# Patient Record
Sex: FEMALE | Race: WHITE | URBAN - NONMETROPOLITAN AREA
[De-identification: names, ages, dates, MRNs, and addresses within clinical notes are randomized per-mention and may not be internally consistent; named-entity substitution may affect disease eponyms.]

---

## 2021-06-28 ENCOUNTER — OFFICE VISIT (OUTPATIENT)
Dept: URBAN - NONMETROPOLITAN AREA CLINIC 13 | Facility: CLINIC | Age: 31
End: 2021-06-28
Payer: MEDICAID

## 2021-06-28 ENCOUNTER — WEB ENCOUNTER (OUTPATIENT)
Dept: URBAN - NONMETROPOLITAN AREA CLINIC 13 | Facility: CLINIC | Age: 31
End: 2021-06-28

## 2021-06-28 DIAGNOSIS — K21.9 ACID REFLUX: ICD-10-CM

## 2021-06-28 DIAGNOSIS — K28.9 ANASTOMOTIC ULCER: ICD-10-CM

## 2021-06-28 DIAGNOSIS — R19.5 LOOSE STOOLS: ICD-10-CM

## 2021-06-28 DIAGNOSIS — T85.898A OTHER SPECIFIED COMPLICATION OF OTHER INTERNAL PROSTHETIC DEVICES, IMPLANTS AND GRAFTS, INITIAL ENCOUNTER: ICD-10-CM

## 2021-06-28 PROCEDURE — 99204 OFFICE O/P NEW MOD 45 MIN: CPT | Performed by: INTERNAL MEDICINE

## 2021-06-28 NOTE — HPI-TODAY'S VISIT:
Ms. Angelita Maldonado is a pleasant 29 y/o F self-referred to GI clinic for acid reflux and history of anastomotic ulcers.  She describes a long series of hospitalizations due to requiring a RNY gastric bypass revision.  This includes a code, sepsis, and hepatic abcesses.  She required a hepatectomy for this as recently as March of this year.  Currently she is pain free but is having some reflux symptoms which sometimes she regurgitates and appears bilious.  She is also having frequent stools, up to 5x daily.

## 2022-02-28 ENCOUNTER — OFFICE VISIT (OUTPATIENT)
Dept: URBAN - NONMETROPOLITAN AREA CLINIC 13 | Facility: CLINIC | Age: 32
End: 2022-02-28

## 2022-02-28 RX ORDER — ALPRAZOLAM 2 MG/1
TABLET ORAL
Qty: 90 | Status: ACTIVE | COMMUNITY

## 2022-02-28 RX ORDER — PROMETHAZINE HYDROCHLORIDE 12.5 MG/1
TABLET ORAL
Qty: 30 | Status: ACTIVE | COMMUNITY

## 2022-02-28 RX ORDER — TRAZODONE HYDROCHLORIDE 100 MG/1
TABLET ORAL
Qty: 60 | Status: ACTIVE | COMMUNITY

## 2022-02-28 RX ORDER — DULOXETINE HYDROCHLORIDE 60 MG/1
CAPSULE, DELAYED RELEASE PELLETS ORAL
Qty: 60 | Status: ACTIVE | COMMUNITY

## 2022-02-28 RX ORDER — RISPERIDONE 2 MG/1
TABLET ORAL
Qty: 30 | Status: ACTIVE | COMMUNITY

## 2022-02-28 RX ORDER — CLOPIDOGREL BISULFATE 75 MG/1
TABLET, FILM COATED ORAL
Qty: 30 | Status: ACTIVE | COMMUNITY

## 2022-02-28 RX ORDER — PANTOPRAZOLE SODIUM 40 MG/1
TABLET, DELAYED RELEASE ORAL
Qty: 30 | Status: ACTIVE | COMMUNITY

## 2022-02-28 RX ORDER — ZIPRASIDONE HYDROCHLORIDE 40 MG/1
CAPSULE ORAL
Qty: 30 | Status: ACTIVE | COMMUNITY

## 2022-02-28 RX ORDER — PAROXETINE 20 MG/1
TABLET, FILM COATED ORAL
Qty: 30 | Status: ACTIVE | COMMUNITY

## 2022-02-28 RX ORDER — HYDROCODONE BITARTRATE AND ACETAMINOPHEN 5; 325 MG/1; MG/1
TABLET ORAL
Qty: 15 | Status: ACTIVE | COMMUNITY

## 2022-02-28 RX ORDER — NITROFURANTOIN 25; 75 MG/1; MG/1
CAPSULE ORAL
Qty: 14 | Status: ACTIVE | COMMUNITY

## 2022-02-28 RX ORDER — METFORMIN HYDROCHLORIDE 500 MG/1
TABLET, FILM COATED ORAL
Qty: 60 | Status: ACTIVE | COMMUNITY

## 2022-02-28 RX ORDER — OXYCODONE HYDROCHLORIDE 5 MG/1
TAKE 1 TABLET BY MOUTH EVERY 4 HOURS AS NEEDED FOR PAIN TABLET ORAL
Qty: 20 | Refills: 0 | Status: ACTIVE | COMMUNITY

## 2022-02-28 RX ORDER — DOXEPIN HYDROCHLORIDE 150 MG/1
CAPSULE ORAL
Qty: 30 | Status: ACTIVE | COMMUNITY

## 2022-02-28 RX ORDER — METHOCARBAMOL 500 MG/1
TABLET ORAL
Qty: 20 | Status: ACTIVE | COMMUNITY

## 2022-02-28 RX ORDER — GABAPENTIN 100 MG/1
CAPSULE ORAL
Qty: 90 | Status: ACTIVE | COMMUNITY

## 2022-02-28 RX ORDER — TRAMADOL HYDROCHLORIDE 50 MG/1
TABLET, COATED ORAL
Qty: 12 | Status: ACTIVE | COMMUNITY

## 2022-02-28 RX ORDER — ATENOLOL 50 MG/1
TABLET ORAL
Qty: 30 | Status: ACTIVE | COMMUNITY

## 2022-02-28 RX ORDER — LIDOCAINE 50 MG/G
PATCH TOPICAL
Qty: 30 | Status: ACTIVE | COMMUNITY

## 2022-02-28 RX ORDER — LAMOTRIGINE 200 MG/1
TABLET ORAL
Qty: 30 | Status: ACTIVE | COMMUNITY

## 2022-02-28 RX ORDER — OXCARBAZEPINE 300 MG/1
TABLET, FILM COATED ORAL
Qty: 90 | Status: ACTIVE | COMMUNITY

## 2022-04-11 ENCOUNTER — TELEPHONE ENCOUNTER (OUTPATIENT)
Dept: URBAN - METROPOLITAN AREA CLINIC 92 | Facility: CLINIC | Age: 32
End: 2022-04-11

## 2022-04-11 ENCOUNTER — LAB OUTSIDE AN ENCOUNTER (OUTPATIENT)
Dept: URBAN - NONMETROPOLITAN AREA CLINIC 13 | Facility: CLINIC | Age: 32
End: 2022-04-11

## 2022-04-11 ENCOUNTER — OFFICE VISIT (OUTPATIENT)
Dept: URBAN - NONMETROPOLITAN AREA CLINIC 13 | Facility: CLINIC | Age: 32
End: 2022-04-11
Payer: MEDICAID

## 2022-04-11 ENCOUNTER — DASHBOARD ENCOUNTERS (OUTPATIENT)
Age: 32
End: 2022-04-11

## 2022-04-11 DIAGNOSIS — K28.9 ANASTOMOTIC ULCER: ICD-10-CM

## 2022-04-11 DIAGNOSIS — R11.2 NAUSEA AND VOMITING, UNSPECIFIED VOMITING TYPE: ICD-10-CM

## 2022-04-11 DIAGNOSIS — R19.5 LOOSE STOOLS: ICD-10-CM

## 2022-04-11 DIAGNOSIS — K62.5 RECTAL BLEEDING: ICD-10-CM

## 2022-04-11 PROBLEM — 305058001: Status: ACTIVE | Noted: 2022-04-11

## 2022-04-11 PROBLEM — 235595009: Status: ACTIVE | Noted: 2021-06-28

## 2022-04-11 PROBLEM — 447408004: Status: ACTIVE | Noted: 2021-06-28

## 2022-04-11 PROBLEM — 2783007: Status: ACTIVE | Noted: 2021-06-28

## 2022-04-11 PROCEDURE — 99214 OFFICE O/P EST MOD 30 MIN: CPT | Performed by: NURSE PRACTITIONER

## 2022-04-11 RX ORDER — LAMOTRIGINE 200 MG/1
TABLET ORAL
Qty: 30 | Status: ACTIVE | COMMUNITY

## 2022-04-11 RX ORDER — PANTOPRAZOLE SODIUM 40 MG/1
TABLET, DELAYED RELEASE ORAL
Qty: 30 | Status: ACTIVE | COMMUNITY

## 2022-04-11 RX ORDER — DOXEPIN HYDROCHLORIDE 150 MG/1
CAPSULE ORAL
Qty: 30 | Status: ACTIVE | COMMUNITY

## 2022-04-11 RX ORDER — ATENOLOL 50 MG/1
TABLET ORAL
Qty: 30 | Status: ACTIVE | COMMUNITY

## 2022-04-11 RX ORDER — NITROFURANTOIN 25; 75 MG/1; MG/1
CAPSULE ORAL
Qty: 14 | Status: ACTIVE | COMMUNITY

## 2022-04-11 RX ORDER — DULOXETINE HYDROCHLORIDE 60 MG/1
CAPSULE, DELAYED RELEASE PELLETS ORAL
Qty: 60 | Status: ACTIVE | COMMUNITY

## 2022-04-11 RX ORDER — TRAZODONE HYDROCHLORIDE 100 MG/1
TABLET ORAL
Qty: 60 | Status: ACTIVE | COMMUNITY

## 2022-04-11 RX ORDER — ALPRAZOLAM 2 MG/1
TABLET ORAL
Qty: 90 | Status: ACTIVE | COMMUNITY

## 2022-04-11 RX ORDER — PROMETHAZINE HYDROCHLORIDE 12.5 MG/1
TABLET ORAL
Qty: 30 | Status: ACTIVE | COMMUNITY

## 2022-04-11 RX ORDER — CLOPIDOGREL BISULFATE 75 MG/1
TABLET, FILM COATED ORAL
Qty: 30 | Status: ACTIVE | COMMUNITY

## 2022-04-11 RX ORDER — OXYCODONE HYDROCHLORIDE 5 MG/1
TAKE 1 TABLET BY MOUTH EVERY 4 HOURS AS NEEDED FOR PAIN TABLET ORAL
Qty: 20 | Refills: 0 | Status: ACTIVE | COMMUNITY

## 2022-04-11 RX ORDER — TRAMADOL HYDROCHLORIDE 50 MG/1
TABLET, COATED ORAL
Qty: 12 | Status: ACTIVE | COMMUNITY

## 2022-04-11 RX ORDER — METHOCARBAMOL 500 MG/1
TABLET ORAL
Qty: 20 | Status: ACTIVE | COMMUNITY

## 2022-04-11 RX ORDER — GABAPENTIN 100 MG/1
CAPSULE ORAL
Qty: 90 | Status: ACTIVE | COMMUNITY

## 2022-04-11 RX ORDER — RISPERIDONE 2 MG/1
TABLET ORAL
Qty: 30 | Status: ACTIVE | COMMUNITY

## 2022-04-11 RX ORDER — ZIPRASIDONE HYDROCHLORIDE 40 MG/1
CAPSULE ORAL
Qty: 30 | Status: ACTIVE | COMMUNITY

## 2022-04-11 RX ORDER — METFORMIN HYDROCHLORIDE 500 MG/1
TABLET, FILM COATED ORAL
Qty: 60 | Status: ACTIVE | COMMUNITY

## 2022-04-11 RX ORDER — LIDOCAINE 50 MG/G
PATCH TOPICAL
Qty: 30 | Status: ACTIVE | COMMUNITY

## 2022-04-11 RX ORDER — HYDROCODONE BITARTRATE AND ACETAMINOPHEN 5; 325 MG/1; MG/1
TABLET ORAL
Qty: 15 | Status: ACTIVE | COMMUNITY

## 2022-04-11 RX ORDER — OXCARBAZEPINE 300 MG/1
TABLET, FILM COATED ORAL
Qty: 90 | Status: ACTIVE | COMMUNITY

## 2022-04-11 RX ORDER — PAROXETINE 20 MG/1
TABLET, FILM COATED ORAL
Qty: 30 | Status: ACTIVE | COMMUNITY

## 2022-04-11 NOTE — HPI-TODAY'S VISIT:
6/28/21-Dr. Eli Ms. Angelita Maldonado is a pleasant 29 y/o F self-referred to GI clinic for acid reflux and history of anastomotic ulcers.  She describes a long series of hospitalizations due to requiring a RNY gastric bypass revision.  This includes a code, sepsis, and hepatic abcesses.  She required a hepatectomy for this as recently as March of this year.  Currently she is pain free but is having some reflux symptoms which sometimes she regurgitates and appears bilious.  She is also having frequent stools, up to 5x daily.  4/11/22 Patient is a very pleasant 32 YO F with s/p RNY with revision and complications as above, hx of CVA on Plavix, who presents with multiple GI issues. She went to ER for abdominal pain, nausea, vomiting, diarrhea and was told her HCG was positive for pregnancy however she is status post UMA 2013. This has remained elevated. She reprots CT was normal and labs were normal.  She c/o continued diarrhea with 5-6 loose stools daily with postprandial urgency and abdominal pain. At times stools appear greasy/oily. Denies constipation. She does see BRB in the stool and on TT that she attributes to hemorrhoids. She has persistent nausea/vomiting. She had EGD 11/2021 with Dr. Schuster that she reports it was normal with expected anatomy changes. She has never had colonoscopy in the past. She has persistent iron deficiency.  ARYAN ANDERSON EGD SCANNED IN

## 2022-05-02 ENCOUNTER — OFFICE VISIT (OUTPATIENT)
Dept: URBAN - METROPOLITAN AREA MEDICAL CENTER 1 | Facility: MEDICAL CENTER | Age: 32
End: 2022-05-02

## 2022-05-24 ENCOUNTER — OFFICE VISIT (OUTPATIENT)
Dept: URBAN - NONMETROPOLITAN AREA CLINIC 13 | Facility: CLINIC | Age: 32
End: 2022-05-24

## 2022-07-08 ENCOUNTER — OFFICE VISIT (OUTPATIENT)
Dept: URBAN - NONMETROPOLITAN AREA CLINIC 2 | Facility: CLINIC | Age: 32
End: 2022-07-08

## 2022-07-29 ENCOUNTER — TELEPHONE ENCOUNTER (OUTPATIENT)
Dept: URBAN - NONMETROPOLITAN AREA CLINIC 2 | Facility: CLINIC | Age: 32
End: 2022-07-29

## 2022-08-22 ENCOUNTER — LAB OUTSIDE AN ENCOUNTER (OUTPATIENT)
Dept: URBAN - NONMETROPOLITAN AREA CLINIC 2 | Facility: CLINIC | Age: 32
End: 2022-08-22

## 2022-08-22 ENCOUNTER — OFFICE VISIT (OUTPATIENT)
Dept: URBAN - METROPOLITAN AREA MEDICAL CENTER 1 | Facility: MEDICAL CENTER | Age: 32
End: 2022-08-22
Payer: MEDICAID

## 2022-08-22 ENCOUNTER — TELEPHONE ENCOUNTER (OUTPATIENT)
Dept: URBAN - METROPOLITAN AREA CLINIC 92 | Facility: CLINIC | Age: 32
End: 2022-08-22

## 2022-08-22 DIAGNOSIS — R19.4 ALTERATION IN BOWEL ELIMINATION: ICD-10-CM

## 2022-08-22 DIAGNOSIS — K62.5 ANAL BLEEDING: ICD-10-CM

## 2022-08-22 PROCEDURE — 45380 COLONOSCOPY AND BIOPSY: CPT | Performed by: INTERNAL MEDICINE

## 2022-08-22 RX ORDER — DOXEPIN HYDROCHLORIDE 150 MG/1
CAPSULE ORAL
Qty: 30 | Status: ACTIVE | COMMUNITY

## 2022-08-22 RX ORDER — ATENOLOL 50 MG/1
TABLET ORAL
Qty: 30 | Status: ACTIVE | COMMUNITY

## 2022-08-22 RX ORDER — DULOXETINE HYDROCHLORIDE 60 MG/1
CAPSULE, DELAYED RELEASE PELLETS ORAL
Qty: 60 | Status: ACTIVE | COMMUNITY

## 2022-08-22 RX ORDER — OXYCODONE HYDROCHLORIDE 5 MG/1
TAKE 1 TABLET BY MOUTH EVERY 4 HOURS AS NEEDED FOR PAIN TABLET ORAL
Qty: 20 | Refills: 0 | Status: ACTIVE | COMMUNITY

## 2022-08-22 RX ORDER — OXCARBAZEPINE 300 MG/1
TABLET, FILM COATED ORAL
Qty: 90 | Status: ACTIVE | COMMUNITY

## 2022-08-22 RX ORDER — GABAPENTIN 100 MG/1
CAPSULE ORAL
Qty: 90 | Status: ACTIVE | COMMUNITY

## 2022-08-22 RX ORDER — LIDOCAINE 50 MG/G
PATCH TOPICAL
Qty: 30 | Status: ACTIVE | COMMUNITY

## 2022-08-22 RX ORDER — RISPERIDONE 2 MG/1
TABLET ORAL
Qty: 30 | Status: ACTIVE | COMMUNITY

## 2022-08-22 RX ORDER — PROMETHAZINE HYDROCHLORIDE 12.5 MG/1
TABLET ORAL
Qty: 30 | Status: ACTIVE | COMMUNITY

## 2022-08-22 RX ORDER — METFORMIN HYDROCHLORIDE 500 MG/1
TABLET, FILM COATED ORAL
Qty: 60 | Status: ACTIVE | COMMUNITY

## 2022-08-22 RX ORDER — ZIPRASIDONE HYDROCHLORIDE 40 MG/1
CAPSULE ORAL
Qty: 30 | Status: ACTIVE | COMMUNITY

## 2022-08-22 RX ORDER — TRAZODONE HYDROCHLORIDE 100 MG/1
TABLET ORAL
Qty: 60 | Status: ACTIVE | COMMUNITY

## 2022-08-22 RX ORDER — TRAMADOL HYDROCHLORIDE 50 MG/1
TABLET, COATED ORAL
Qty: 12 | Status: ACTIVE | COMMUNITY

## 2022-08-22 RX ORDER — PAROXETINE 20 MG/1
TABLET, FILM COATED ORAL
Qty: 30 | Status: ACTIVE | COMMUNITY

## 2022-08-22 RX ORDER — CLOPIDOGREL BISULFATE 75 MG/1
TABLET, FILM COATED ORAL
Qty: 30 | Status: ACTIVE | COMMUNITY

## 2022-08-22 RX ORDER — LAMOTRIGINE 200 MG/1
TABLET ORAL
Qty: 30 | Status: ACTIVE | COMMUNITY

## 2022-08-22 RX ORDER — HYDROCODONE BITARTRATE AND ACETAMINOPHEN 5; 325 MG/1; MG/1
TABLET ORAL
Qty: 15 | Status: ACTIVE | COMMUNITY

## 2022-08-22 RX ORDER — METHOCARBAMOL 500 MG/1
TABLET ORAL
Qty: 20 | Status: ACTIVE | COMMUNITY

## 2022-08-22 RX ORDER — COLESTIPOL HYDROCHLORIDE 1 G/1
1 TABLET TABLET ORAL TWICE A DAY
Qty: 60 TABLET | Refills: 3 | OUTPATIENT
Start: 2022-08-22

## 2022-08-22 RX ORDER — NITROFURANTOIN 25; 75 MG/1; MG/1
CAPSULE ORAL
Qty: 14 | Status: ACTIVE | COMMUNITY

## 2022-08-22 RX ORDER — PANTOPRAZOLE SODIUM 40 MG/1
TABLET, DELAYED RELEASE ORAL
Qty: 30 | Status: ACTIVE | COMMUNITY

## 2022-08-22 RX ORDER — ALPRAZOLAM 2 MG/1
TABLET ORAL
Qty: 90 | Status: ACTIVE | COMMUNITY

## 2022-08-31 ENCOUNTER — TELEPHONE ENCOUNTER (OUTPATIENT)
Dept: URBAN - NONMETROPOLITAN AREA CLINIC 2 | Facility: CLINIC | Age: 32
End: 2022-08-31

## 2022-08-31 RX ORDER — HYDROCORTISONE ACETATE 25 MG/1
1 SUPPOSITORY SUPPOSITORY RECTAL ONCE A DAY
Qty: 14 SUPPOSITORY | Refills: 2 | OUTPATIENT
Start: 2022-08-31 | End: 2022-10-12

## 2022-09-28 ENCOUNTER — TELEPHONE ENCOUNTER (OUTPATIENT)
Dept: URBAN - NONMETROPOLITAN AREA CLINIC 2 | Facility: CLINIC | Age: 32
End: 2022-09-28

## 2022-10-21 ENCOUNTER — OFFICE VISIT (OUTPATIENT)
Dept: URBAN - NONMETROPOLITAN AREA CLINIC 2 | Facility: CLINIC | Age: 32
End: 2022-10-21